# Patient Record
Sex: MALE | Race: WHITE | Employment: OTHER | URBAN - METROPOLITAN AREA
[De-identification: names, ages, dates, MRNs, and addresses within clinical notes are randomized per-mention and may not be internally consistent; named-entity substitution may affect disease eponyms.]

---

## 2018-03-01 ENCOUNTER — PATIENT OUTREACH (OUTPATIENT)
Dept: CASE MANAGEMENT | Age: 83
End: 2018-03-01

## 2018-03-01 NOTE — PROGRESS NOTES
Outreached to patient in regards to enrollment to Chronic Care Management program. Left message for patient to return my call at ext. [phone]. Thank you.

## 2018-03-08 ENCOUNTER — PATIENT OUTREACH (OUTPATIENT)
Dept: CASE MANAGEMENT | Age: 83
End: 2018-03-08

## 2018-03-08 NOTE — PROGRESS NOTES
Outreached to patient in regards to enrollment to Chronic Care Management program. 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 I will call patient tomorrow morning 3/9/18 between 8:30am-9am, per patient. Thank you.

## 2018-03-14 ENCOUNTER — PATIENT OUTREACH (OUTPATIENT)
Dept: CASE MANAGEMENT | Age: 83
End: 2018-03-14

## 2018-03-14 NOTE — PROGRESS NOTES
Outreached to patient in regards to enrollment to Chronic Care Management program. Left message for patient to return my call at ext. [phone]. Thank you.